# Patient Record
Sex: FEMALE | Race: WHITE | NOT HISPANIC OR LATINO | URBAN - METROPOLITAN AREA
[De-identification: names, ages, dates, MRNs, and addresses within clinical notes are randomized per-mention and may not be internally consistent; named-entity substitution may affect disease eponyms.]

---

## 2021-01-04 ENCOUNTER — OFFICE VISIT (OUTPATIENT)
Dept: URBAN - METROPOLITAN AREA CLINIC 94 | Facility: CLINIC | Age: 66
End: 2021-01-04
Payer: COMMERCIAL

## 2021-01-04 ENCOUNTER — WEB ENCOUNTER (OUTPATIENT)
Dept: URBAN - METROPOLITAN AREA CLINIC 94 | Facility: CLINIC | Age: 66
End: 2021-01-04

## 2021-01-04 VITALS
TEMPERATURE: 97.2 F | DIASTOLIC BLOOD PRESSURE: 91 MMHG | SYSTOLIC BLOOD PRESSURE: 149 MMHG | HEART RATE: 51 BPM | HEIGHT: 66 IN

## 2021-01-04 DIAGNOSIS — K59.00 CONSTIPATION, UNSPECIFIED CONSTIPATION TYPE: ICD-10-CM

## 2021-01-04 DIAGNOSIS — R10.84 GENERALIZED ABDOMINAL PAIN: ICD-10-CM

## 2021-01-04 PROCEDURE — G8427 DOCREV CUR MEDS BY ELIG CLIN: HCPCS | Performed by: INTERNAL MEDICINE

## 2021-01-04 PROCEDURE — 1036F TOBACCO NON-USER: CPT | Performed by: INTERNAL MEDICINE

## 2021-01-04 PROCEDURE — G8483 FLU IMM NO ADMIN DOC REA: HCPCS | Performed by: INTERNAL MEDICINE

## 2021-01-04 PROCEDURE — 99204 OFFICE O/P NEW MOD 45 MIN: CPT | Performed by: INTERNAL MEDICINE

## 2021-01-04 RX ORDER — ESTRADIOL 1 MG/1
1 TABLET TABLET ORAL ONCE A DAY
Status: ACTIVE | COMMUNITY

## 2021-01-04 RX ORDER — LEVOTHYROXINE SODIUM 0.1 MG/1
1 TABLET IN THE MORNING ON AN EMPTY STOMACH TABLET ORAL ONCE A DAY
Qty: 30 | Status: ACTIVE | COMMUNITY
Start: 2021-01-04

## 2021-01-04 RX ORDER — LINACLOTIDE 145 UG/1
1 CAPSULE AT LEAST 30 MINUTES BEFORE THE FIRST MEAL OF THE DAY ON AN EMPTY STOMACH CAPSULE, GELATIN COATED ORAL ONCE A DAY
Qty: 60 | Refills: 2 | OUTPATIENT
Start: 2021-01-04

## 2021-01-04 RX ORDER — TRAMADOL HYDROCHLORIDE 50 MG/1
1 TABLET AS NEEDED TABLET, FILM COATED ORAL ONCE A DAY
Status: ACTIVE | COMMUNITY

## 2021-01-04 RX ORDER — PANTOPRAZOLE 40 MG/1
(PRIOR AUTH#:000006342720) TABLET, DELAYED RELEASE ORAL
Qty: 30 DELAYED RELEASE TABLET | Status: ACTIVE | COMMUNITY

## 2021-01-04 NOTE — PHYSICAL EXAM CHEST:
no lesions,  no deformities,  no traumatic injuries,  no significant scars are present,  chest wall non-tender,  no masses present, breathing is unlabored without accessory muscle use,normal breath sounds

## 2021-01-04 NOTE — HPI-TODAY'S VISIT:
64 y/o F with hx of fibromyalgia here with abdominal pain and constipation  Patient reports she's had generalized abdominal pain, upper and lower, that occurs intermittently. Has been present for years. Also has constipation with a hard BM once every 3-4 days without blood. Has tried miralax and OTC meds without any benefit.   Went to urgent care a few days ago, with normal CBC/CMP/Lipase and Ultrasound RUQ. Discharged on protonix, dicyclomine, which she thinks helps a bit  Does use daily NSAID for migraines and fibromyalgia Per patient, last colonoscopy 2 years ago. No family hx of GI malignancy, IBD

## 2021-01-04 NOTE — PHYSICAL EXAM NECK/THYROID:
normal appearance , without tenderness upon palpation , no deformities , trachea midline , Thyroid normal size , no thyroid nodules , no masses , no JVD , thyroid nontender

## 2021-01-04 NOTE — PHYSICAL EXAM CARDIOVASCULAR:
no edema,  no murmurs,  regular rate and rhythm , no edema,  no murmurs,  regular rate and rhythm , no edema,  no murmurs,  regular rate and rhythm , no edema,  no murmurs,  regular rate and rhythm

## 2021-01-04 NOTE — PHYSICAL EXAM SKIN:
no rashes , no suspicious lesions , no areas of discoloration , no jaundice present , good turgor , no masses , no tenderness on palpation

## 2021-01-08 ENCOUNTER — OFFICE VISIT (OUTPATIENT)
Dept: URBAN - METROPOLITAN AREA SURGERY CENTER 17 | Facility: SURGERY CENTER | Age: 66
End: 2021-01-08
Payer: COMMERCIAL

## 2021-01-08 ENCOUNTER — CLAIMS CREATED FROM THE CLAIM WINDOW (OUTPATIENT)
Dept: URBAN - METROPOLITAN AREA CLINIC 4 | Facility: CLINIC | Age: 66
End: 2021-01-08
Payer: COMMERCIAL

## 2021-01-08 DIAGNOSIS — K29.60 ADENOPAPILLOMATOSIS GASTRICA: ICD-10-CM

## 2021-01-08 DIAGNOSIS — K44.9 DIAPHRAGMATIC HERNIA: ICD-10-CM

## 2021-01-08 DIAGNOSIS — K29.40 CHRONIC ATROPHIC GASTRITIS WITHOUT BLEEDING: ICD-10-CM

## 2021-01-08 DIAGNOSIS — R10.84 ABDOMINAL CRAMPING, GENERALIZED: ICD-10-CM

## 2021-01-08 PROCEDURE — 88305 TISSUE EXAM BY PATHOLOGIST: CPT | Performed by: PATHOLOGY

## 2021-01-08 PROCEDURE — 43239 EGD BIOPSY SINGLE/MULTIPLE: CPT | Performed by: INTERNAL MEDICINE

## 2021-01-08 PROCEDURE — 88342 IMHCHEM/IMCYTCHM 1ST ANTB: CPT | Performed by: PATHOLOGY

## 2021-01-08 PROCEDURE — G8907 PT DOC NO EVENTS ON DISCHARG: HCPCS | Performed by: INTERNAL MEDICINE

## 2021-01-08 RX ORDER — ESTRADIOL 1 MG/1
1 TABLET TABLET ORAL ONCE A DAY
Status: ACTIVE | COMMUNITY

## 2021-01-08 RX ORDER — LINACLOTIDE 145 UG/1
1 CAPSULE AT LEAST 30 MINUTES BEFORE THE FIRST MEAL OF THE DAY ON AN EMPTY STOMACH CAPSULE, GELATIN COATED ORAL ONCE A DAY
Qty: 60 | Refills: 2 | Status: ACTIVE | COMMUNITY
Start: 2021-01-04

## 2021-01-08 RX ORDER — TRAMADOL HYDROCHLORIDE 50 MG/1
1 TABLET AS NEEDED TABLET, FILM COATED ORAL ONCE A DAY
Status: ACTIVE | COMMUNITY

## 2021-01-08 RX ORDER — PANTOPRAZOLE 40 MG/1
(PRIOR AUTH#:000006342720) TABLET, DELAYED RELEASE ORAL
Qty: 30 DELAYED RELEASE TABLET | Status: ACTIVE | COMMUNITY

## 2021-01-08 RX ORDER — LEVOTHYROXINE SODIUM 0.1 MG/1
1 TABLET IN THE MORNING ON AN EMPTY STOMACH TABLET ORAL ONCE A DAY
Qty: 30 | Status: ACTIVE | COMMUNITY
Start: 2021-01-04

## 2021-01-25 ENCOUNTER — OFFICE VISIT (OUTPATIENT)
Dept: URBAN - METROPOLITAN AREA CLINIC 94 | Facility: CLINIC | Age: 66
End: 2021-01-25

## 2021-01-25 ENCOUNTER — OFFICE VISIT (OUTPATIENT)
Dept: URBAN - METROPOLITAN AREA CLINIC 94 | Facility: CLINIC | Age: 66
End: 2021-01-25
Payer: COMMERCIAL

## 2021-01-25 DIAGNOSIS — K29.70 GASTRITIS DETERMINED BY BIOPSY: ICD-10-CM

## 2021-01-25 DIAGNOSIS — K58.9 IBS (IRRITABLE BOWEL SYNDROME)-C: ICD-10-CM

## 2021-01-25 PROCEDURE — G8483 FLU IMM NO ADMIN DOC REA: HCPCS | Performed by: INTERNAL MEDICINE

## 2021-01-25 PROCEDURE — 1036F TOBACCO NON-USER: CPT | Performed by: INTERNAL MEDICINE

## 2021-01-25 PROCEDURE — 99214 OFFICE O/P EST MOD 30 MIN: CPT | Performed by: INTERNAL MEDICINE

## 2021-01-25 PROCEDURE — G8427 DOCREV CUR MEDS BY ELIG CLIN: HCPCS | Performed by: INTERNAL MEDICINE

## 2021-01-25 RX ORDER — LINACLOTIDE 145 UG/1
1 CAPSULE AT LEAST 30 MINUTES BEFORE THE FIRST MEAL OF THE DAY ON AN EMPTY STOMACH CAPSULE, GELATIN COATED ORAL ONCE A DAY
Qty: 60 | Refills: 2 | Status: ACTIVE | COMMUNITY
Start: 2021-01-04

## 2021-01-25 RX ORDER — DICYCLOMINE HYDROCHLORIDE 20 MG/1
1 TABLET TABLET ORAL PRN
Status: ACTIVE | COMMUNITY

## 2021-01-25 RX ORDER — LEVOTHYROXINE SODIUM 0.1 MG/1
1 TABLET IN THE MORNING ON AN EMPTY STOMACH TABLET ORAL ONCE A DAY
Qty: 30 | Status: ACTIVE | COMMUNITY
Start: 2021-01-04

## 2021-01-25 RX ORDER — ESTRADIOL 1 MG/1
1 TABLET TABLET ORAL ONCE A DAY
Status: ACTIVE | COMMUNITY

## 2021-01-25 RX ORDER — TRAMADOL HYDROCHLORIDE 50 MG/1
1 TABLET AS NEEDED TABLET, FILM COATED ORAL ONCE A DAY
Status: ACTIVE | COMMUNITY

## 2021-01-25 RX ORDER — PANTOPRAZOLE 40 MG/1
(PRIOR AUTH#:000006342720) TABLET, DELAYED RELEASE ORAL
Qty: 30 DELAYED RELEASE TABLET | Status: ACTIVE | COMMUNITY

## 2021-01-25 NOTE — HPI-TODAY'S VISIT:
66 y/o with hx of migraines, fibromyalgia here for f/u of constipation/abdominal pain consistent with IBS-C  Patient with constipation with hard BM with cramping without benefit of OTC meds including miralax. Given linzess 145mcg that helped slightly but not enough with some days worse than others. Dicyclomine helped initially but not right now  Also reported generalized abdominal pain that included upper and lower. Normal labs and ultrasound RUQ. CT a/p 01/2021 with large retained stool, and some segmental fat in liver. Given significant NSAID use and pain, underwent EGD 01/2021 mild gastritis with biopsies showing changes suggestive of treated H.pylori or PPI. Patient denies ever being treated for H.pylori but is on daily PPI  Per patient, last colonoscopy 2 years ago that was normal

## 2021-01-26 ENCOUNTER — TELEPHONE ENCOUNTER (OUTPATIENT)
Dept: URBAN - METROPOLITAN AREA CLINIC 94 | Facility: CLINIC | Age: 66
End: 2021-01-26

## 2021-01-27 ENCOUNTER — TELEPHONE ENCOUNTER (OUTPATIENT)
Dept: URBAN - METROPOLITAN AREA CLINIC 94 | Facility: CLINIC | Age: 66
End: 2021-01-27

## 2021-02-10 ENCOUNTER — TELEPHONE ENCOUNTER (OUTPATIENT)
Dept: URBAN - METROPOLITAN AREA CLINIC 23 | Facility: CLINIC | Age: 66
End: 2021-02-10

## 2021-02-17 ENCOUNTER — TELEPHONE ENCOUNTER (OUTPATIENT)
Dept: URBAN - METROPOLITAN AREA CLINIC 94 | Facility: CLINIC | Age: 66
End: 2021-02-17

## 2021-02-19 ENCOUNTER — OFFICE VISIT (OUTPATIENT)
Dept: URBAN - METROPOLITAN AREA CLINIC 118 | Facility: CLINIC | Age: 66
End: 2021-02-19

## 2021-02-26 LAB — H. PYLORI STOOL AG, EIA: NEGATIVE

## 2021-03-01 ENCOUNTER — TELEPHONE ENCOUNTER (OUTPATIENT)
Dept: URBAN - METROPOLITAN AREA CLINIC 94 | Facility: CLINIC | Age: 66
End: 2021-03-01

## 2021-03-08 ENCOUNTER — CLAIMS CREATED FROM THE CLAIM WINDOW (OUTPATIENT)
Dept: URBAN - METROPOLITAN AREA CLINIC 94 | Facility: CLINIC | Age: 66
End: 2021-03-08
Payer: COMMERCIAL

## 2021-03-08 ENCOUNTER — TELEPHONE ENCOUNTER (OUTPATIENT)
Dept: URBAN - METROPOLITAN AREA CLINIC 94 | Facility: CLINIC | Age: 66
End: 2021-03-08

## 2021-03-08 VITALS
HEART RATE: 52 BPM | BODY MASS INDEX: 29.73 KG/M2 | DIASTOLIC BLOOD PRESSURE: 82 MMHG | TEMPERATURE: 98.1 F | WEIGHT: 185 LBS | SYSTOLIC BLOOD PRESSURE: 137 MMHG | HEIGHT: 66 IN

## 2021-03-08 DIAGNOSIS — K58.9 IBS (IRRITABLE BOWEL SYNDROME)-C: ICD-10-CM

## 2021-03-08 DIAGNOSIS — K29.70 GASTRITIS DETERMINED BY BIOPSY: ICD-10-CM

## 2021-03-08 PROBLEM — 196731005 GASTRODUODENITIS: Status: ACTIVE | Noted: 2021-01-25

## 2021-03-08 PROCEDURE — 99214 OFFICE O/P EST MOD 30 MIN: CPT | Performed by: INTERNAL MEDICINE

## 2021-03-08 RX ORDER — ESTRADIOL 1 MG/1
1 TABLET TABLET ORAL ONCE A DAY
Status: ACTIVE | COMMUNITY

## 2021-03-08 RX ORDER — TRAMADOL HYDROCHLORIDE 50 MG/1
1 TABLET AS NEEDED TABLET, FILM COATED ORAL ONCE A DAY
Status: ACTIVE | COMMUNITY

## 2021-03-08 RX ORDER — LINACLOTIDE 145 UG/1
1 CAPSULE AT LEAST 30 MINUTES BEFORE THE FIRST MEAL OF THE DAY ON AN EMPTY STOMACH CAPSULE, GELATIN COATED ORAL ONCE A DAY
Qty: 60 | Refills: 2 | Status: DISCONTINUED | COMMUNITY
Start: 2021-01-04

## 2021-03-08 RX ORDER — LEVOTHYROXINE SODIUM 0.1 MG/1
1 TABLET IN THE MORNING ON AN EMPTY STOMACH TABLET ORAL ONCE A DAY
Qty: 30 | Status: ACTIVE | COMMUNITY
Start: 2021-01-04

## 2021-03-08 RX ORDER — AMITRIPTYLINE HYDROCHLORIDE 10 MG/1
1 TABLET AT BEDTIME TABLET, FILM COATED ORAL ONCE A DAY
Qty: 60 TABLET | Refills: 0 | OUTPATIENT
Start: 2021-03-08

## 2021-03-08 RX ORDER — DICYCLOMINE HYDROCHLORIDE 20 MG/1
1 TABLET TABLET ORAL PRN
Status: ACTIVE | COMMUNITY

## 2021-03-08 RX ORDER — PANTOPRAZOLE 40 MG/1
(PRIOR AUTH#:000006342720) TABLET, DELAYED RELEASE ORAL
Qty: 30 DELAYED RELEASE TABLET | Status: ACTIVE | COMMUNITY

## 2021-03-08 NOTE — HPI-TODAY'S VISIT:
64 y/o with hx of migraines, fibromyalgia here for f/u of constipation/abdominal pain consistent with IBS-C  Patient with constipation with hard BM with cramping which has been unresponsive to OTC laxatives, linzess, trulance, bentyl, enemas.  The cramping is generalized abdominal pain that included upper and lower. Normal labs and ultrasound RUQ. CT a/p 01/2021 with large retained stool, and some segmental fat in liver. Given significant NSAID use and pain, underwent EGD 01/2021 mild gastritis with biopsies showing changes suggestive of treated H.pylori or PPI, with stool study to confirm negative.   Per patient, last colonoscopy 3 years ago that was normal

## 2021-04-20 ENCOUNTER — OFFICE VISIT (OUTPATIENT)
Dept: URBAN - METROPOLITAN AREA CLINIC 94 | Facility: CLINIC | Age: 66
End: 2021-04-20

## 2021-08-23 ENCOUNTER — WEB ENCOUNTER (OUTPATIENT)
Dept: URBAN - METROPOLITAN AREA CLINIC 118 | Facility: CLINIC | Age: 66
End: 2021-08-23

## 2021-08-26 ENCOUNTER — CLAIMS CREATED FROM THE CLAIM WINDOW (OUTPATIENT)
Dept: URBAN - METROPOLITAN AREA CLINIC 70 | Facility: CLINIC | Age: 66
End: 2021-08-26
Payer: COMMERCIAL

## 2021-08-26 ENCOUNTER — WEB ENCOUNTER (OUTPATIENT)
Dept: URBAN - METROPOLITAN AREA CLINIC 70 | Facility: CLINIC | Age: 66
End: 2021-08-26

## 2021-08-26 DIAGNOSIS — R10.84 GENERALIZED ABDOMINAL PAIN: ICD-10-CM

## 2021-08-26 DIAGNOSIS — K58.1 IRRITABLE BOWEL SYNDROME WITH CONSTIPATION: ICD-10-CM

## 2021-08-26 PROCEDURE — 99214 OFFICE O/P EST MOD 30 MIN: CPT | Performed by: INTERNAL MEDICINE

## 2021-08-26 RX ORDER — AMITRIPTYLINE HYDROCHLORIDE 10 MG/1
1 TABLET AT BEDTIME TABLET, FILM COATED ORAL ONCE A DAY
Qty: 60 TABLET | Refills: 0 | Status: DISCONTINUED | COMMUNITY
Start: 2021-03-08

## 2021-08-26 RX ORDER — DICYCLOMINE HYDROCHLORIDE 20 MG/1
1 TABLET TABLET ORAL PRN
Status: ACTIVE | COMMUNITY

## 2021-08-26 RX ORDER — ESTRADIOL 1 MG/1
1 TABLET TABLET ORAL ONCE A DAY
Status: ACTIVE | COMMUNITY

## 2021-08-26 RX ORDER — DICYCLOMINE HYDROCHLORIDE 20 MG/1
1 TABLET TABLET ORAL PRN
OUTPATIENT

## 2021-08-26 RX ORDER — LEVOTHYROXINE SODIUM 0.1 MG/1
1 TABLET IN THE MORNING ON AN EMPTY STOMACH TABLET ORAL ONCE A DAY
Qty: 30 | Status: ACTIVE | COMMUNITY
Start: 2021-01-04

## 2021-08-26 RX ORDER — PANTOPRAZOLE 40 MG/1
(PRIOR AUTH#:000006342720) TABLET, DELAYED RELEASE ORAL
Qty: 30 DELAYED RELEASE TABLET | Status: DISCONTINUED | COMMUNITY

## 2021-08-26 RX ORDER — LINACLOTIDE 72 UG/1
1 CAPSULE AT LEAST 30 MINUTES BEFORE THE FIRST MEAL OF THE DAY ON AN EMPTY STOMACH CAPSULE, GELATIN COATED ORAL ONCE A DAY
Qty: 30 | OUTPATIENT
Start: 2021-08-26 | End: 2021-09-25

## 2021-08-26 RX ORDER — TRAMADOL HYDROCHLORIDE 50 MG/1
1 TABLET AS NEEDED TABLET, FILM COATED ORAL ONCE A DAY
Status: DISCONTINUED | COMMUNITY

## 2021-08-26 NOTE — PHYSICAL EXAM HENT:
Head, normocephalic , atraumatic , Face , Face within normal limits , Ears , External ears within normal limits , Nose/Nasopharynx , External nose  normal appearance , Mouth and Throat , Oral cavity appearance normal

## 2021-08-26 NOTE — HPI-TODAY'S VISIT:
OV 03/08/2021 64 y/o with hx of migraines, fibromyalgia here for f/u of constipation/abdominal pain consistent with IBS-C Patient with constipation with hard BM with cramping which has been unresponsive to OTC laxatives, linzess, trulance, bentyl, enemas.  The cramping is generalized abdominal pain that included upper and lower. Normal labs (12/20201) and ultrasound RUQ (12/2021). CT a/p 01/2021 with large retained stool, and some segmental fat in liver. Given significant NSAID use and pain, underwent EGD 01/2021 mild gastritis with biopsies showing changes suggestive of treated H.pylori or PPI, with stool study to confirm negative.  Per patient, last colonoscopy 3 years ago that was normal  Today 08/26/2021: Patient reports continued severe sharp crampy abdominal pain predominately in RLQ but also generalized with persistent constipation. Last BM 3 days ago with hard stool and feeling of incomplete evacuation. Normal bowel habit is BM x 1/week on average. She reports going to ER while on vacation in [facility] a few weeks ago with repeat Abd Ct and labs unremarkable. She has been taking Milk of magnesia which seems to now also not be working. She has tried multiple previously medications in the past as above. Liness (290mg) and trulance gave her severe cramps and OTC medications were not strong enough. She never started trial of amitriptyline that was given to her at last OV. States she had labs done by PCP withing the last couple of weeks with TSH normal. Denies fever, wt loss, N/V, diarrhea, or signs of GI bleeding.

## 2021-09-01 ENCOUNTER — TELEPHONE ENCOUNTER (OUTPATIENT)
Dept: URBAN - METROPOLITAN AREA CLINIC 23 | Facility: CLINIC | Age: 66
End: 2021-09-01

## 2021-09-03 ENCOUNTER — TELEPHONE ENCOUNTER (OUTPATIENT)
Dept: URBAN - METROPOLITAN AREA CLINIC 92 | Facility: CLINIC | Age: 66
End: 2021-09-03

## 2021-09-03 PROBLEM — 14760008: Status: ACTIVE | Noted: 2021-01-04

## 2021-09-03 RX ORDER — LINACLOTIDE 72 UG/1
1 CAPSULE AT LEAST 30 MINUTES BEFORE THE FIRST MEAL OF THE DAY ON AN EMPTY STOMACH CAPSULE, GELATIN COATED ORAL ONCE A DAY
Qty: 30 | Status: ACTIVE | COMMUNITY
Start: 2021-08-26 | End: 2021-09-25

## 2021-09-03 RX ORDER — ESTRADIOL 1 MG/1
1 TABLET TABLET ORAL ONCE A DAY
Status: ACTIVE | COMMUNITY

## 2021-09-03 RX ORDER — LUBIPROSTONE 8 UG/1
1 CAPSULE WITH FOOD AND WATER CAPSULE, GELATIN COATED ORAL TWICE A DAY
Qty: 60 | OUTPATIENT
Start: 2021-09-03 | End: 2021-10-03

## 2021-09-03 RX ORDER — LEVOTHYROXINE SODIUM 0.1 MG/1
1 TABLET IN THE MORNING ON AN EMPTY STOMACH TABLET ORAL ONCE A DAY
Qty: 30 | Status: ACTIVE | COMMUNITY
Start: 2021-01-04

## 2021-09-03 RX ORDER — DICYCLOMINE HYDROCHLORIDE 20 MG/1
1 TABLET TABLET ORAL PRN
Status: ACTIVE | COMMUNITY

## 2021-09-07 ENCOUNTER — TELEPHONE ENCOUNTER (OUTPATIENT)
Dept: URBAN - METROPOLITAN AREA CLINIC 69 | Facility: CLINIC | Age: 66
End: 2021-09-07

## 2021-10-04 ENCOUNTER — CLAIMS CREATED FROM THE CLAIM WINDOW (OUTPATIENT)
Dept: URBAN - METROPOLITAN AREA CLINIC 70 | Facility: CLINIC | Age: 66
End: 2021-10-04
Payer: COMMERCIAL

## 2021-10-04 ENCOUNTER — DASHBOARD ENCOUNTERS (OUTPATIENT)
Age: 66
End: 2021-10-04

## 2021-10-04 ENCOUNTER — LAB OUTSIDE AN ENCOUNTER (OUTPATIENT)
Dept: URBAN - METROPOLITAN AREA CLINIC 70 | Facility: CLINIC | Age: 66
End: 2021-10-04

## 2021-10-04 VITALS
SYSTOLIC BLOOD PRESSURE: 144 MMHG | HEART RATE: 59 BPM | TEMPERATURE: 97.1 F | HEIGHT: 66 IN | BODY MASS INDEX: 29.85 KG/M2 | DIASTOLIC BLOOD PRESSURE: 85 MMHG

## 2021-10-04 DIAGNOSIS — K58.1 IRRITABLE BOWEL SYNDROME WITH CONSTIPATION: ICD-10-CM

## 2021-10-04 DIAGNOSIS — R10.84 GENERALIZED ABDOMINAL PAIN: ICD-10-CM

## 2021-10-04 PROBLEM — 440630006: Status: ACTIVE | Noted: 2021-08-26

## 2021-10-04 PROBLEM — 10743008 IRRITABLE BOWEL SYNDROME: Status: ACTIVE | Noted: 2021-01-25

## 2021-10-04 PROCEDURE — 99214 OFFICE O/P EST MOD 30 MIN: CPT | Performed by: INTERNAL MEDICINE

## 2021-10-04 RX ORDER — DICYCLOMINE HYDROCHLORIDE 20 MG/1
1 TABLET TABLET ORAL PRN
OUTPATIENT

## 2021-10-04 RX ORDER — DICYCLOMINE HYDROCHLORIDE 20 MG/1
1 TABLET TABLET ORAL PRN
Status: ACTIVE | COMMUNITY

## 2021-10-04 RX ORDER — ESTRADIOL 1 MG/1
1 TABLET TABLET ORAL ONCE A DAY
Status: ACTIVE | COMMUNITY

## 2021-10-04 RX ORDER — LEVOTHYROXINE SODIUM 0.1 MG/1
1 TABLET IN THE MORNING ON AN EMPTY STOMACH TABLET ORAL ONCE A DAY
Qty: 30 | Status: ACTIVE | COMMUNITY
Start: 2021-01-04

## 2021-10-04 NOTE — HPI-TODAY'S VISIT:
OV 03/08/2021 64 y/o with hx of migraines, fibromyalgia here for f/u of constipation/abdominal pain consistent with IBS-C Patient with constipation with hard BM with cramping which has been unresponsive to OTC laxatives, linzess, trulance, bentyl, enemas.  The cramping is generalized abdominal pain that included upper and lower. Normal labs (12/20201) and ultrasound RUQ (12/2021). CT a/p 01/2021 with large retained stool, and some segmental fat in liver. Given significant NSAID use and pain, underwent EGD 01/2021 mild gastritis with biopsies showing changes suggestive of treated H.pylori or PPI, with stool study to confirm negative.  Per patient, last colonoscopy 3 years ago that was normal -------------------------------------------------------------------- 08/26/2021: Patient reports continued severe sharp crampy abdominal pain predominately in RLQ but also generalized with persistent constipation. Last BM 3 days ago with hard stool and feeling of incomplete evacuation. Normal bowel habit is BM x 1/week on average. She reports [facility] with repeat Abd Ct and labs unremarkable. She has been taking Milk of magnesia which seems to now also not be working. She has tried multiple previously medications in the past as above. Liness (290mg) and trulance gave her severe cramps and OTC medications were not strong enough. She never started trial of amitriptyline that was given to her at last OV. States she had labs done by PCP withing the last couple of weeks with TSH normal. Denies fever, wt loss, N/V, diarrhea, or signs of GI bleeding. --------------------------------------------------------------- Today 10/4/21: Patient presents for follow up. She reports continued abdominal cramping and intermittent RLQ pain. Pain partial improves after BM and with dicyclomine. She reports Linzess did not work it just made her cramp, along with Amitiza which is too expensive. She states "I dont understand why I dont't go to the bathroom every day" that "it is not normal to have to take a laxative everyday to have a BM". Her last BM was 3 days ago after taking MOM which seems to work. She states "someone I knew was treated for years and she had ovarian cancer". She has an appt coming up with GYN. She is anxious and tearful today upon exam. She requests to have another colonoscopy. Admits to associated nausea which improves with BM. Denies wt loss, diarrhea, vomiting, or rectal bleeding.

## 2021-10-11 ENCOUNTER — TELEPHONE ENCOUNTER (OUTPATIENT)
Dept: URBAN - METROPOLITAN AREA CLINIC 92 | Facility: CLINIC | Age: 66
End: 2021-10-11

## 2021-10-11 ENCOUNTER — OFFICE VISIT (OUTPATIENT)
Dept: URBAN - METROPOLITAN AREA SURGERY CENTER 24 | Facility: SURGERY CENTER | Age: 66
End: 2021-10-11
Payer: COMMERCIAL

## 2021-10-11 DIAGNOSIS — R19.4 CHANGE IN BOWEL HABIT: ICD-10-CM

## 2021-10-11 PROCEDURE — 45378 DIAGNOSTIC COLONOSCOPY: CPT | Performed by: INTERNAL MEDICINE

## 2021-10-11 PROCEDURE — G8907 PT DOC NO EVENTS ON DISCHARG: HCPCS | Performed by: INTERNAL MEDICINE

## 2021-10-11 RX ORDER — DICYCLOMINE HYDROCHLORIDE 20 MG/1
1 TABLET TABLET ORAL PRN
Status: ACTIVE | COMMUNITY

## 2021-10-11 RX ORDER — LEVOTHYROXINE SODIUM 0.1 MG/1
1 TABLET IN THE MORNING ON AN EMPTY STOMACH TABLET ORAL ONCE A DAY
Qty: 30 | Status: ACTIVE | COMMUNITY
Start: 2021-01-04

## 2021-10-11 RX ORDER — ESTRADIOL 1 MG/1
1 TABLET TABLET ORAL ONCE A DAY
Status: ACTIVE | COMMUNITY

## 2025-08-25 ENCOUNTER — OFFICE VISIT (OUTPATIENT)
Dept: URBAN - METROPOLITAN AREA CLINIC 70 | Facility: CLINIC | Age: 70
End: 2025-08-25
Payer: COMMERCIAL

## 2025-08-25 DIAGNOSIS — Z12.11 COLON CANCER SCREENING: ICD-10-CM

## 2025-08-25 DIAGNOSIS — R10.84 GENERALIZED ABDOMINAL PAIN: ICD-10-CM

## 2025-08-25 DIAGNOSIS — K58.1 IRRITABLE BOWEL SYNDROME WITH CONSTIPATION: ICD-10-CM

## 2025-08-25 PROCEDURE — 992 APS NON BILLABLE: Performed by: NURSE PRACTITIONER

## 2025-08-25 RX ORDER — DICYCLOMINE HYDROCHLORIDE 20 MG/1
1 TABLET TABLET ORAL PRN
OUTPATIENT
Start: 2025-08-25

## 2025-08-25 RX ORDER — AMLODIPINE BESYLATE 5 MG/1
TABLET ORAL
Qty: 90 TABLET | Status: ACTIVE | COMMUNITY

## 2025-08-25 RX ORDER — MINOXIDIL 2.5 MG/1
TABLET ORAL
Qty: 15 TABLET | Status: ACTIVE | COMMUNITY

## 2025-08-25 RX ORDER — HYDROCODONE BITARTRATE AND ACETAMINOPHEN 7.5; 325 MG/1; MG/1
TABLET ORAL
Qty: 6 TABLET | Status: ACTIVE | COMMUNITY

## 2025-08-25 RX ORDER — ESTRADIOL 1 MG/1
1 TABLET TABLET ORAL ONCE A DAY
Status: ACTIVE | COMMUNITY

## 2025-08-25 RX ORDER — DICYCLOMINE HYDROCHLORIDE 20 MG/1
1 TABLET TABLET ORAL PRN
Status: DISCONTINUED | COMMUNITY

## 2025-08-25 RX ORDER — IBUPROFEN 800 MG/1
1 TABLET WITH FOOD OR MILK AS NEEDED TABLET, FILM COATED ORAL
Status: ACTIVE | COMMUNITY

## 2025-08-25 RX ORDER — LEVOTHYROXINE SODIUM 0.1 MG/1
1 TABLET IN THE MORNING ON AN EMPTY STOMACH TABLET ORAL ONCE A DAY
Qty: 30 | Status: ACTIVE | COMMUNITY
Start: 2021-01-04